# Patient Record
Sex: FEMALE | Race: WHITE | Employment: FULL TIME | URBAN - METROPOLITAN AREA
[De-identification: names, ages, dates, MRNs, and addresses within clinical notes are randomized per-mention and may not be internally consistent; named-entity substitution may affect disease eponyms.]

---

## 2016-03-21 LAB — HBA1C MFR BLD: 7.9 % (ref 4–5.6)

## 2016-07-11 LAB — TSH SERPL-ACNC: 3.3 UIU/ML (ref 0.2–4.5)

## 2017-01-04 ENCOUNTER — DOCUMENTATION ONLY (OUTPATIENT)
Dept: TRANSPLANT | Facility: CLINIC | Age: 62
End: 2017-01-04

## 2017-01-11 ENCOUNTER — TELEPHONE (OUTPATIENT)
Dept: TRANSPLANT | Facility: CLINIC | Age: 62
End: 2017-01-11

## 2017-01-11 NOTE — TELEPHONE ENCOUNTER
Hailey reports patient last seen on 07/11/16 & has an upcoming appointment on Friday, January 13, 2017.

## 2017-01-16 ENCOUNTER — MEDICAL CORRESPONDENCE (OUTPATIENT)
Dept: TRANSPLANT | Facility: CLINIC | Age: 62
End: 2017-01-16

## 2017-02-09 ENCOUNTER — TELEPHONE (OUTPATIENT)
Dept: TRANSPLANT | Facility: CLINIC | Age: 62
End: 2017-02-09

## 2017-02-09 NOTE — TELEPHONE ENCOUNTER
"Spoke with patient and noted have reviewed lab results from 01/13/17 with Dr. Zavala; with EGFR 25, noted patient can be moved to active status on the Kidney and kidney/pancreas wait lists.  From Dr. Rodriguez's 01/13/17 assessment, patient's weight was measured as 177 pounds, with a height of 4 feet, 11 inches tall.  Confirmed with patient that current height/weight are correct as patient's weight at transplant evaluation in January 2016 was just under 156 pounds.  Patient reports, \"I don't know where I gained it.  I'm not that very active. I have plantar fascitis.\" Noted with current height and weight, her body mass index (BMI) is 35.2, which is above the program bmi guideline for kidney/pancrea Asked if patient would be open to meeting again with the pre-transplant dietician.  Patient reports she would.  Also, since has been just over year since last stress test, Dr. Zavala does recommend patient have this test updated.  Told patient I will update Dr. Zavala with patient's current body mass index information and then will contact patient regarding dietician and stress test appointments.  Patient verbalizes she understands this information and is agreeable with this plan.   "

## 2017-03-07 ENCOUNTER — TELEPHONE (OUTPATIENT)
Dept: TRANSPLANT | Facility: CLINIC | Age: 62
End: 2017-03-07

## 2017-03-07 NOTE — TELEPHONE ENCOUNTER
Reviewed that Dr. Zavala does recommend patient lose weight before coming back to the clinic to meet with a transplant surgeon.  Faina reports she has been eating more due to her diabetes.  Offered to have our transplant registered dietician contact Faina to review diet guidelines for weight loss, but Faina reports she has an appointment with Dr. Westbrook (endocrine) next week and is going to ask him about weight loss.  She reports she will contact me after the appointment.  Per her request, I confirmed since she is currently inactive status on the kidney and kidney/pancreas wait lists, she doesn't need to send PRA samples every 3 months.  Noted I will await Faina's call.  She verbalizes she is in agreement with this plan.

## 2017-03-07 NOTE — TELEPHONE ENCOUNTER
Faina reports currently busy and requests I call her back later today as she is not able to write down my phone number.

## 2017-04-10 LAB
ALT SERPL-CCNC: 20 U/L (ref 9–55)
AST SERPL-CCNC: 21 U/L (ref 10–40)
CHOLEST SERPL-MCNC: 257 MG/DL (ref 0–199)
CREAT SERPL-MCNC: 1.8 MG/DL (ref 0.55–1.02)
GFR SERPL CREATININE-BSD FRML MDRD: 29 ML/MIN/1.73M2
HDLC SERPL-MCNC: 91 MG/DL
HEP C HIM: NORMAL
LDLC SERPL CALC-MCNC: 152 MG/DL (ref 19–130)
POTASSIUM SERPL-SCNC: 4.3 MMOL/L (ref 3.5–5.2)
TRIGL SERPL-MCNC: 71 MG/DL (ref 4–149)

## 2017-06-20 ENCOUNTER — TRANSFERRED RECORDS (OUTPATIENT)
Dept: HEALTH INFORMATION MANAGEMENT | Facility: CLINIC | Age: 62
End: 2017-06-20

## 2017-07-20 ENCOUNTER — TELEPHONE (OUTPATIENT)
Dept: TRANSPLANT | Facility: CLINIC | Age: 62
End: 2017-07-20

## 2017-07-20 NOTE — TELEPHONE ENCOUNTER
"Faina reports she did buy a recombinant bike and is trying to use; that she weighed herself this morning and reports her weight was 174.5lbs, so she lost 2.5 lbs.  Reports most recent creatine was 1.8 & she is seeing Dr. Chavez on 08/01/17/sees every 6 months; Faina reports she doesn't eat much, \" I had 2 cups of coffee with sugar free creamer, a lettuce salad for lunch with oil and vinegar dressing & some suzy crackers with sodium free peanut butter.  When my  and I go out to eat, we split a meal.  Dr. Westbrook has been trying to tweak my insulin pump and it's better, but I still need to eat something at bed time, so my sugars don't bottom out during the night.  Faina reports she's still not interested in meeting our speaking with our transplant dietician as \"What can they tell me?  I really don't eat a lot.\"  Faina verbalizes she remains interested in having a kidney/pancreas transplant, but also notes, \"I'm going to be 62, so I know my time is limited.\"  Encouraged Faina to use the recombinant bike and obtained her permission to request notes from Dr. Westbrook's office as well as 08/01/17 clinic visit notes for Dr. Chavez (when available).  Once I receive the information, I will review it with the transplant team.  Encouraged Faina to contact me should she have questions.   "

## 2017-08-01 ENCOUNTER — TRANSFERRED RECORDS (OUTPATIENT)
Dept: HEALTH INFORMATION MANAGEMENT | Facility: CLINIC | Age: 62
End: 2017-08-01

## 2017-08-07 ENCOUNTER — MEDICAL CORRESPONDENCE (OUTPATIENT)
Dept: TRANSPLANT | Facility: CLINIC | Age: 62
End: 2017-08-07

## 2017-09-17 ENCOUNTER — HEALTH MAINTENANCE LETTER (OUTPATIENT)
Age: 62
End: 2017-09-17

## 2017-10-31 ENCOUNTER — TRANSFERRED RECORDS (OUTPATIENT)
Dept: HEALTH INFORMATION MANAGEMENT | Facility: CLINIC | Age: 62
End: 2017-10-31

## 2017-12-05 ENCOUNTER — TELEPHONE (OUTPATIENT)
Dept: TRANSPLANT | Facility: CLINIC | Age: 62
End: 2017-12-05

## 2017-12-05 NOTE — TELEPHONE ENCOUNTER
"Patient asks when she's due to have PRA drawn.  Noted since she remains inactive status on the kidney and kidney/pancreas lists, she doesn't need to have the sample drawn unless she is being considered for active status.  Patient reports she's \"doing ok;\" that her current weight is 271 pounds; that she sees her nephrologist every 6 months and endocrinologist every 3 months.  Patient reports she has a CGM monitor, so \"my  doesn't need to bring me back if my sugar is low.  The monitor works well.\"  Asked whether patient would like to proceed to active status on the kidney/pancreas. Patient reports that she would prefer not to have any surgery at this time; \"I would like things to stay as they are, but if my kidneys start to go, I would do whatever I need to avoid dialysis.  It would also be nice not to be diabetic anymore.\"  Patient also reports she has a return appointment with Dr. Kay scheduled in January 2018 and does she need to keep this appointment.  Noted if she does not wish to pursue active status on the transplant list, she doesn't need to keep the appointment, but before cancelling it, I would like to request most current endocrinology clinic notes/results and review with our team.  Obtained patient's verbal permission to request these records and noted once I have received and reviewed her status with our team, I will update her.  Patient verbalizes she agrees with this plan.   "

## 2017-12-06 ENCOUNTER — MEDICAL CORRESPONDENCE (OUTPATIENT)
Dept: TRANSPLANT | Facility: CLINIC | Age: 62
End: 2017-12-06

## 2017-12-07 ENCOUNTER — TELEPHONE (OUTPATIENT)
Dept: TRANSPLANT | Facility: CLINIC | Age: 62
End: 2017-12-07

## 2018-04-27 ENCOUNTER — TRANSFERRED RECORDS (OUTPATIENT)
Dept: HEALTH INFORMATION MANAGEMENT | Facility: CLINIC | Age: 63
End: 2018-04-27

## 2018-05-13 ENCOUNTER — TRANSFERRED RECORDS (OUTPATIENT)
Dept: HEALTH INFORMATION MANAGEMENT | Facility: CLINIC | Age: 63
End: 2018-05-13

## 2018-05-14 ENCOUNTER — TELEPHONE (OUTPATIENT)
Dept: TRANSPLANT | Facility: CLINIC | Age: 63
End: 2018-05-14

## 2018-05-15 ENCOUNTER — TELEPHONE (OUTPATIENT)
Dept: TRANSPLANT | Facility: CLINIC | Age: 63
End: 2018-05-15

## 2018-05-15 VITALS — HEIGHT: 59 IN | WEIGHT: 176 LBS | BODY MASS INDEX: 35.48 KG/M2

## 2018-05-15 NOTE — TELEPHONE ENCOUNTER
"Patient reports seen by Dr. Chavez in February, reports creatinine 2.03, seen by diabetes specialist 04/27/18, had pelvic/pap @ [facility] on 05/07/18, and mammogram on 04/27/18.  Patient reports she remains interested in kidney/pancreas transplant, but \"not today.  I don't want to go through a major surgery until absolutely necessary.\"  She notes the CGM has been very helpful, but that her insurance will be changing from COBRA to a User[employer] plan and that she will be applying for Medicare after her 63th birthday later, so she would also prefer to \"wait until the insurance stuff calms down.\"  Obtained patient's permission to request aforementioned clinic notes and test results and will review with transplant provider once received.  Explained there is not a set \"cutoff\" age for kidney/pancreas, but when patient are approaching age 63, we review on a case by case basis.  Patient verbalizes she understands this information.   "

## 2018-05-22 ENCOUNTER — MEDICAL CORRESPONDENCE (OUTPATIENT)
Dept: TRANSPLANT | Facility: CLINIC | Age: 63
End: 2018-05-22

## 2018-05-23 ENCOUNTER — COMMITTEE REVIEW (OUTPATIENT)
Dept: TRANSPLANT | Facility: CLINIC | Age: 63
End: 2018-05-23

## 2018-06-11 NOTE — COMMITTEE REVIEW
Abdominal Patient Discussion Note Transplant Coordinator: Mya Shirley  Transplant Surgeon:   Abraham Oliveira    Referring Physician: Aravind Chavez    Committee Review Members:  Nephrology Ok Schaffer, APRN CNP, Sarkis Elias MD   Nutrition Tanika Vazquez,    Pharmacy Clarisse Mai, [facility]    - Clinical Connie Farrar, [facility], Sully Yanes, MSW   Transplant Amparo Collins PA-C, Mya Shirley, DALI, Martha Vallecillo, [facility], Benita Zheng RN, Jose Mcleod MD, Darryl Steele MD       Additional Discussion Notes and Findings:   Case not discussed due to time limit of meeting.

## 2018-06-27 ENCOUNTER — COMMITTEE REVIEW (OUTPATIENT)
Dept: TRANSPLANT | Facility: CLINIC | Age: 63
End: 2018-06-27

## 2018-06-27 NOTE — COMMITTEE REVIEW
Abdominal Patient Discussion Note Transplant Coordinator: Mya Shirley  Transplant Surgeon:   Abraham Oliveira    Referring Physician: Aravind Chavez    Committee Review Members:  Nephrology Kendrick Richardson MD, Sarkis Elias MD   Nutrition Tanika Vazquez,    Pharmacy Emi Keller, [facility]    - Clinical Connie Farrar, MSW, Sully Yanes, MSW   Transplant Amparo Collins PA-C, Mya Shirley, DALI, Taylor Guadalupe, RN, Juliane Shook, DALI, Benita Zheng, DALI, Zack Zavala MD, Jose Mcleod MD, Zeny Turner, DALI       Additional Discussion Notes and Findings:   Case reviewed by team.  Team advises patient be scheduled for weight loss management  clinic appointments in the fall after insurance has been changed.  (Patient previously reported she does not want to come back for appointments until her insurance has changed after 08/30/18.

## 2018-10-02 ENCOUNTER — TELEPHONE (OUTPATIENT)
Dept: TRANSPLANT | Facility: CLINIC | Age: 63
End: 2018-10-02

## 2018-10-02 NOTE — TELEPHONE ENCOUNTER
"Faina reports she thought she had given new insurance information to Janae Grullon when they last spoke.  Confirmed from Janae's note that Faina was to contact Janae with this information.  Reviewed once this is completed, since it has been a few years since Faina has seen a transplant provider, would like to schedule an appointment for her.  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    "

## 2018-11-25 ENCOUNTER — HOSPITAL ENCOUNTER (EMERGENCY)
Facility: CLINIC | Age: 63
Discharge: HOME | End: 2018-11-25
Attending: EMERGENCY MEDICINE | Admitting: EMERGENCY MEDICINE
Payer: MEDICARE

## 2018-11-25 VITALS
TEMPERATURE: 98.3 F | RESPIRATION RATE: 18 BRPM | DIASTOLIC BLOOD PRESSURE: 79 MMHG | BODY MASS INDEX: 35.95 KG/M2 | OXYGEN SATURATION: 95 % | SYSTOLIC BLOOD PRESSURE: 139 MMHG | HEART RATE: 86 BPM | WEIGHT: 178 LBS

## 2018-11-25 DIAGNOSIS — M54.2 NECK PAIN: ICD-10-CM

## 2018-11-25 LAB
ANION GAP SERPL CALCULATED.3IONS-SCNC: 15 MMOL/L (ref 3–14)
BASOPHILS # BLD AUTO: 0 10E9/L (ref 0–0.2)
BASOPHILS NFR BLD AUTO: 0.3 %
BUN SERPL-MCNC: 37 MG/DL (ref 7–30)
CALCIUM SERPL-MCNC: 8.4 MG/DL (ref 8.5–10.1)
CHLORIDE SERPL-SCNC: 101 MMOL/L (ref 94–109)
CO2 SERPL-SCNC: 22 MMOL/L (ref 20–32)
CREAT SERPL-MCNC: 1.79 MG/DL (ref 0.52–1.04)
DIFFERENTIAL METHOD BLD: NORMAL
EOSINOPHIL NFR BLD AUTO: 2 %
ERYTHROCYTE [DISTWIDTH] IN BLOOD BY AUTOMATED COUNT: 12.7 % (ref 10–15)
GFR SERPL CREATININE-BSD FRML MDRD: 28 ML/MIN/1.7M2
GLUCOSE SERPL-MCNC: 179 MG/DL (ref 70–99)
HCT VFR BLD AUTO: 41.1 % (ref 35–47)
HGB BLD-MCNC: 13.6 G/DL (ref 11.7–15.7)
IMM GRANULOCYTES # BLD: 0 10E9/L (ref 0–0.4)
IMM GRANULOCYTES NFR BLD: 0.3 %
LYMPHOCYTES # BLD AUTO: 0.8 10E9/L (ref 0.8–5.3)
LYMPHOCYTES NFR BLD AUTO: 7.7 %
MCH RBC QN AUTO: 31.6 PG (ref 26.5–33)
MCHC RBC AUTO-ENTMCNC: 33.1 G/DL (ref 31.5–36.5)
MCV RBC AUTO: 95 FL (ref 78–100)
MONOCYTES # BLD AUTO: 0.5 10E9/L (ref 0–1.3)
MONOCYTES NFR BLD AUTO: 5.4 %
NEUTROPHILS # BLD AUTO: 8.2 10E9/L (ref 1.6–8.3)
NEUTROPHILS NFR BLD AUTO: 84.3 %
NRBC # BLD AUTO: 0 10*3/UL
NRBC BLD AUTO-RTO: 0 /100
PLATELET # BLD AUTO: 341 10E9/L (ref 150–450)
POTASSIUM SERPL-SCNC: 3.7 MMOL/L (ref 3.4–5.3)
RBC # BLD AUTO: 4.31 10E12/L (ref 3.8–5.2)
SODIUM SERPL-SCNC: 138 MMOL/L (ref 133–144)
TROPONIN I SERPL-MCNC: <0.015 UG/L (ref 0–0.04)
WBC # BLD AUTO: 9.8 10E9/L (ref 4–11)

## 2018-11-25 PROCEDURE — 93005 ELECTROCARDIOGRAM TRACING: CPT | Performed by: EMERGENCY MEDICINE

## 2018-11-25 PROCEDURE — 85025 COMPLETE CBC W/AUTO DIFF WBC: CPT | Performed by: EMERGENCY MEDICINE

## 2018-11-25 PROCEDURE — 84484 ASSAY OF TROPONIN QUANT: CPT | Performed by: EMERGENCY MEDICINE

## 2018-11-25 PROCEDURE — 96374 THER/PROPH/DIAG INJ IV PUSH: CPT | Performed by: EMERGENCY MEDICINE

## 2018-11-25 PROCEDURE — 99285 EMERGENCY DEPT VISIT HI MDM: CPT | Mod: 25 | Performed by: EMERGENCY MEDICINE

## 2018-11-25 PROCEDURE — 93010 ELECTROCARDIOGRAM REPORT: CPT | Mod: Z6 | Performed by: EMERGENCY MEDICINE

## 2018-11-25 PROCEDURE — 25000128 H RX IP 250 OP 636: Performed by: EMERGENCY MEDICINE

## 2018-11-25 PROCEDURE — 80048 BASIC METABOLIC PNL TOTAL CA: CPT | Performed by: EMERGENCY MEDICINE

## 2018-11-25 RX ORDER — HYDROCODONE BITARTRATE AND ACETAMINOPHEN 5; 325 MG/1; MG/1
1 TABLET ORAL EVERY 4 HOURS PRN
Qty: 15 TABLET | Refills: 0 | Status: SHIPPED | OUTPATIENT
Start: 2018-11-25

## 2018-11-25 RX ORDER — HYDROMORPHONE HYDROCHLORIDE 1 MG/ML
0.2 INJECTION, SOLUTION INTRAMUSCULAR; INTRAVENOUS; SUBCUTANEOUS ONCE
Status: COMPLETED | OUTPATIENT
Start: 2018-11-25 | End: 2018-11-25

## 2018-11-25 RX ADMIN — Medication 0.2 MG: at 07:57

## 2018-11-25 NOTE — DISCHARGE INSTRUCTIONS
Your EKG and blood tests for heart as the cause of your symptoms were normal.  Please take the pain medicines with cautions.  Return to the ER if new or worsening symptoms.

## 2018-11-25 NOTE — ED NOTES
Pt denies improvement in pain after dilaudid.  Pt states she does not like the side effects, uneasy whole body sensation.  Pt was neausous but that subsided.  Pt declined further dilaudid.  Discussion of previous medications that helped with pain relief revealed success with hydrocodone.  MD José advised.

## 2018-11-25 NOTE — ED AVS SNAPSHOT
[facility] Emergency Department    [address] [place] [address]    Phone:  [phone]    Fax:  [phone]                                       Ana Seth   MRN: [id number]    Department:  [facility] Emergency Department   Date of Visit:  11/25/2018           Patient Information     Date Of Birth          1955        Your diagnoses for this visit were:     Neck pain        You were seen by Kendrick Kumar MD.      Follow-up Information     Follow up with Kendrick Phillips In 3 days.    Specialty:  Pediatrics    Why:  If symptoms worsen, ER follow up    Contact information:    [facility]  [address] [address] [phone]          Discharge Instructions       Your EKG and blood tests for heart as the cause of your symptoms were normal.  Please take the pain medicines with cautions.  Return to the ER if new or worsening symptoms.    24 Hour Appointment Hotline       To make an appointment at any [facility], call [phone] ([phone]). If you don't have a family doctor or clinic, we will help you find one. [facility] clinics are conveniently located to serve the needs of you and your family.             Review of your medicines      START taking        Dose / Directions Last dose taken    HYDROcodone-acetaminophen 5-325 MG tablet   Commonly known as:  NORCO   Dose:  1 tablet   Quantity:  15 tablet        Take 1 tablet by mouth every 4 hours as needed   Refills:  0          Our records show that you are taking the medicines listed below. If these are incorrect, please call your family doctor or clinic.        Dose / Directions Last dose taken    AMLODIPINE BESYLATE PO   Dose:  10 mg        Take 10 mg by mouth daily   Refills:  0        Aspirin 81 MG Tbdp   Dose:  81 mg   Quantity:  72 tablet        Take 81 mg by mouth daily   Refills:  0        brimonidine-timolol 0.2-0.5 % ophthalmic solution   Commonly known as:  COMBIGAN    Dose:  1 drop        Place 1 drop into the right eye 2 times daily   Refills:  0        insulin aspart 100 UNIT/ML pen   Commonly known as:  NovoLOG PEN        Inject Subcutaneous 3 times daily (with meals) Insulin pump   Refills:  0        latanoprost 0.005 % ophthalmic solution   Commonly known as:  XALATAN   Dose:  1 drop        Place 1 drop into the right eye At Bedtime   Refills:  0        VITAMIN D PO   Dose:  1 capsule        Take 1 capsule by mouth One capsule every Monday, Wednesday and Friday evening   Refills:  0                Information about OPIOIDS     PRESCRIPTION OPIOIDS: WHAT YOU NEED TO KNOW   We gave you an opioid (narcotic) pain medicine. It is important to manage your pain, but opioids are not always the best choice. You should first try all the other options your care team gave you. Take this medicine for as short a time (and as few doses) as possible.    Some activities can increase your pain, such as bandage changes or therapy sessions. It may help to take your pain medicine 30 to 60 minutes before these activities. Reduce your stress by getting enough sleep, working on hobbies you enjoy and practicing relaxation or meditation. Talk to your care team about ways to manage your pain beyond prescription opioids.    These medicines have risks:    DO NOT drive when on new or higher doses of pain medicine. These medicines can affect your alertness and reaction times, and you could be arrested for driving under the influence (DUI). If you need to use opioids long-term, talk to your care team about driving.    DO NOT operate heavy machinery    DO NOT do any other dangerous activities while taking these medicines.    DO NOT drink any alcohol while taking these medicines.     If the opioid prescribed includes acetaminophen, DO NOT take with any other medicines that contain acetaminophen. Read all labels carefully. Look for the word  acetaminophen  or  Tylenol.  Ask your pharmacist if you have  questions or are unsure.    You can get addicted to pain medicines, especially if you have a history of addiction (chemical, alcohol or substance dependence). Talk to your care team about ways to reduce this risk.    All opioids tend to cause constipation. Drink plenty of water and eat foods that have a lot of fiber, such as fruits, vegetables, prune juice, apple juice and high-fiber cereal. Take a laxative (Miralax, milk of magnesia, Colace, Senna) if you don t move your bowels at least every other day. Other side effects include upset stomach, sleepiness, dizziness, throwing up, tolerance (needing more of the medicine to have the same effect), physical dependence and slowed breathing.    Store your pills in a secure place, locked if possible. We will not replace any lost or stolen medicine. If you don t finish your medicine, please throw away (dispose) as directed by your pharmacist. The [place] [employer]ution [employer] has more information about safe disposal: https://[email or web address].[email or web address]/living-green/managing-unwanted-medications        Prescriptions were sent or printed at these locations (1 Prescription)                   Other Prescriptions                Printed at Department/Unit printer (1 of 1)         HYDROcodone-acetaminophen (NORCO) 5-325 MG tablet                Procedures and tests performed during your visit     Basic metabolic panel    CBC with platelets differential    EKG 12 lead    Peripheral IV catheter    Troponin I      Orders Needing Specimen Collection     None      Pending Results     No orders found from 11/23/2018 to 11/26/2018.            Pending Culture Results     No orders found from 11/23/2018 to 11/26/2018.            Pending Results Instructions     If you had any lab results that were not finalized at the time of your Discharge, you can call the ED Lab Result RN at [phone]. You will be contacted by this team for any positive Lab results or changes in treatment. The nurses  are available 7 days a week from 10A to 6:30P.  You can leave a message 24 hours per day and they will return your call.        Thank you for choosing [facility]       Thank you for choosing [facility] for your care. Our goal is always to provide you with excellent care. Hearing back from our patients is one way we can continue to improve our services. Please take a few minutes to complete the written survey that you may receive in the mail after you visit with us. Thank you!        [facility] Information     [facility] gives you secure access to your electronic health record. If you see a primary care provider, you can also send messages to your care team and make appointments. If you have questions, please call your primary care clinic.  If you do not have a primary care provider, please call [phone] and they will assist you.        Care EveryWhere ID     This is your Care EveryWhere ID. This could be used by other organizations to access your [facility] medical records  [id number]        Equal Access to Services     SANTHOSH SILVA : Jony Young, waalicja william, gabriele kaalmaclayton torres, rodrigo mckeon. So [facility] [phone].    ATENCIÓN: Si habla español, tiene a duval disposición servicios gratuitos de asistencia lingüística. Trish al [phone].    We comply with applicable federal civil rights laws and [place] laws. We do not discriminate on the basis of race, color, national origin, age, disability, sex, sexual orientation, or gender identity.            After Visit Summary       This is your record. Keep this with you and show to your community pharmacist(s) and doctor(s) at your next visit.

## 2018-11-25 NOTE — ED AVS SNAPSHOT
[facility] Emergency Department    [address] [place] [address]    Phone:  [phone]    Fax:  [phone]                                       Ana Seth   MRN: [id number]    Department:  [facility] Emergency Department   Date of Visit:  11/25/2018           After Visit Summary Signature Page     I have received my discharge instructions, and my questions have been answered. I have discussed any challenges I see with this plan with the nurse or doctor.    ..........................................................................................................................................  Patient/Patient Representative Signature      ..........................................................................................................................................  Patient Representative Print Name and Relationship to Patient    ..................................................               ................................................  Date                                   Time    ..........................................................................................................................................  Reviewed by Signature/Title    ...................................................              ..............................................  Date                                               Time          22EPIC Rev 08/18

## 2018-11-25 NOTE — ED PROVIDER NOTES
History     Chief Complaint   Patient presents with     Neck Pain     Flank Pain     HPI  Ana Seth is a 63 year old female who presents the emergency department complaining of left-sided neck pain and left side pain.  This started last night.  At 9:30 PM she started having left neck pain.  She did not have an injury.  She took to Tylenol.  By 2 AM she had left side pain.  He has been fairly severe, 7 out of 10.  Is been constant.  Tylenol did not help.  She has had no other symptoms.  No headache, dizziness, lightheadedness, nausea, diaphoresis, chest pain, shortness of breath, abdominal pain, leg swelling, shortness of breath.  She does have chronic kidney disease.  She is also diabetic.  She has no history of coronary artery disease.  No fever or chills.    Problem List:    Patient Active Problem List    Diagnosis Date Noted     CKD (chronic kidney disease) stage 4, GFR 15-29 ml/min (H) 2015     Priority: Medium        Past Medical History:    Past Medical History:   Diagnosis Date     Anterior ischemic optic neuropathy      Chronic renal failure      Diabetes mellitus type I (H)      Hypercholesterolemia      Hypertension        Past Surgical History:    Past Surgical History:   Procedure Laterality Date      SECTION         Family History:    Family History   Problem Relation Age of Onset     Coronary Artery Disease Father      Multiple MIs     Coronary Artery Disease Early Onset Brother 40     CABG at age 40     Cerebrovascular Disease Father      Type 1 Diabetes Brother        Social History:  Marital Status:   [2]  Social History   Substance Use Topics     Smoking status: Former Smoker     Smokeless tobacco: Not on file     Alcohol use 0.0 oz/week     0 Standard drinks or equivalent per week      Comment: Patient reports drinking a glass of wine on occasion.         Medications:      HYDROcodone-acetaminophen (NORCO) 5-325 MG tablet   AMLODIPINE BESYLATE PO   Aspirin 81 MG TBDP    brimonidine-timolol (COMBIGAN) 0.2-0.5 % ophthalmic solution   Cholecalciferol (VITAMIN D PO)   insulin aspart (NOVOLOG PEN) 100 UNIT/ML soln   latanoprost (XALATAN) 0.005 % ophthalmic solution         Review of Systems   All other systems reviewed and are negative.      Physical Exam   BP: 158/85  Pulse: 75  Heart Rate: 81  Temp: 98.3  F (36.8  C)  Resp: 18  Weight: 80.7 kg (178 lb)  SpO2: 98 %      Physical Exam   Constitutional: She is oriented to person, place, and time. She appears well-developed and well-nourished. No distress.   HENT:   Head: Normocephalic and atraumatic.   Eyes: No scleral icterus.   Neck: Normal range of motion. Neck supple. No tracheal deviation present. No thyromegaly present.   Tenderness palpation over the left trapezius but not over the cervical spine or the shoulder or the rest of the spine.   Cardiovascular: Normal rate and regular rhythm.  Exam reveals no gallop.    No murmur heard.  Pulmonary/Chest: Effort normal and breath sounds normal. She exhibits tenderness.   Tenderness over the left side of her rib cage   Abdominal: Soft. She exhibits no distension. There is no tenderness.   Neurological: She is alert and oriented to person, place, and time.   Skin: Skin is warm and dry. No rash noted. She is not diaphoretic. No erythema. No pallor.   Psychiatric: She has a normal mood and affect.       ED Course     ED Course     Procedures          Suspected muscular pain but given back and side pain a work up for cardiac etiology performed.               EKG Interpretation:      Interpreted by Kendrick Kumar  Time reviewed:825   Symptoms at time of EK   Rhythm: normal sinus   Rate: normal  Axis: left  Ectopy: none  Conduction: LAFB  ST Segments/ T Waves: No ST-T wave changes  Q Waves: none  Comparison to prior: No old EKG available    Clinical Impression: normal EKG    Results for orders placed or performed during the hospital encounter of 18 (from the past 24 hour(s))    CBC with platelets differential   Result Value Ref Range    WBC 9.8 4.0 - 11.0 10e9/L    RBC Count 4.31 3.8 - 5.2 10e12/L    Hemoglobin 13.6 11.7 - 15.7 g/dL    Hematocrit 41.1 35.0 - 47.0 %    MCV 95 78 - 100 fl    MCH 31.6 26.5 - 33.0 pg    MCHC 33.1 31.5 - 36.5 g/dL    RDW 12.7 10.0 - 15.0 %    Platelet Count 341 150 - 450 10e9/L    Diff Method Automated Method     % Neutrophils 84.3 %    % Lymphocytes 7.7 %    % Monocytes 5.4 %    % Eosinophils 2.0 %    % Basophils 0.3 %    % Immature Granulocytes 0.3 %    Nucleated RBCs 0 0 /100    Absolute Neutrophil 8.2 1.6 - 8.3 10e9/L    Absolute Lymphocytes 0.8 0.8 - 5.3 10e9/L    Absolute Monocytes 0.5 0.0 - 1.3 10e9/L    Absolute Basophils 0.0 0.0 - 0.2 10e9/L    Abs Immature Granulocytes 0.0 0 - 0.4 10e9/L    Absolute Nucleated RBC 0.0    Basic metabolic panel   Result Value Ref Range    Sodium 138 133 - 144 mmol/L    Potassium 3.7 3.4 - 5.3 mmol/L    Chloride 101 94 - 109 mmol/L    Carbon Dioxide 22 20 - 32 mmol/L    Anion Gap 15 (H) 3 - 14 mmol/L    Glucose 179 (H) 70 - 99 mg/dL    Urea Nitrogen 37 (H) 7 - 30 mg/dL    Creatinine 1.79 (H) 0.52 - 1.04 mg/dL    GFR Estimate 28 (L) >60 mL/min/1.7m2    GFR Estimate If Black 34 (L) >60 mL/min/1.7m2    Calcium 8.4 (L) 8.5 - 10.1 mg/dL   Troponin I   Result Value Ref Range    Troponin I ES <0.015 0.000 - 0.045 ug/L       Medications   HYDROmorphone (PF) (DILAUDID) injection 0.2 mg (0.2 mg Intravenous Given 11/25/18 0757)       Assessments & Plan (with Medical Decision Making)  Back and side pain with a negative cardiac workup.  The patient did not get improvement with Dilaudid but has tolerated hydrocodone in the past.  Will discharge home with 15 tablets of hydrocodone. The differential diagnosis, treatment options, risks and follow up discussed with a competent patient and/or competent family member who agrees with the plan.       I have reviewed the nursing notes.    I have reviewed the findings, diagnosis, plan and need  for follow up with the patient.      Discharge Medication List as of 11/25/2018  8:48 AM      START taking these medications    Details   HYDROcodone-acetaminophen (NORCO) 5-325 MG tablet Take 1 tablet by mouth every 4 hours as needed, Disp-15 tablet, R-0, Local Print             Final diagnoses:   Neck pain       11/25/2018   [facility] EMERGENCY DEPARTMENT     Kendrick Kumar MD  11/25/18 0593

## 2019-08-21 ENCOUNTER — MEDICAL CORRESPONDENCE (OUTPATIENT)
Dept: TRANSPLANT | Facility: CLINIC | Age: 64
End: 2019-08-21

## 2019-09-25 ENCOUNTER — COMMITTEE REVIEW (OUTPATIENT)
Dept: TRANSPLANT | Facility: CLINIC | Age: 64
End: 2019-09-25

## 2019-09-25 NOTE — COMMITTEE REVIEW
Abdominal Patient Discussion Note Transplant Coordinator: Mya Shirley  Transplant Surgeon:   Abraham Oliveira    Referring Physician: Aravind Chavez    Committee Review Members:  Nephrology Kendrick Richardson MD, KYLE Martinez CNP   Nutrition Tanika Vazquez, RD   Pharmacy Mejia Bose, [facility]    - Clinical Connie Farrar, [facility], Sully Yanes, MSW   Transplant Mya Shirley, DALI, Taylor Guadalupe RN, Juliane Shook, DALI, Jose Mcleod MD, Zeny Turner RN   Transplant Surgery Dominga Leyva MD, MD       Additional Discussion Notes and Findings:   Case reviewed by team.  Patient listed for kidney and kidney transplant 01/18/2016, remains Inactive status on K list as EGFR has remained above 20, 24 on 03/18/2019; Inactive status on kidney/pancreas list as body mass index has continued to increase, 35.76 on 08/20/19.  Team reviewed current insulin dosages, age and that patient would need to lose a significant amount of wait to meet body mass index criteria for kidney/pancreas transplant.  Team recommends patient be removed from the kidney/pancreas list as likely with a pancreas transplant, with current insulin dosages, patient will likely not become insulin independent.  Patient to remain inactive status on the kidney list until further decline in EGFR and patient will need to meet body mass index of 35 or less for kidney transplant.

## 2019-09-26 ENCOUNTER — TELEPHONE (OUTPATIENT)
Dept: TRANSPLANT | Facility: CLINIC | Age: 64
End: 2019-09-26

## 2019-09-26 NOTE — LETTER
September 26, 2019    Ana Seth  [id number] [address] [place] [address]    Dear Ms. Seth,    The purpose of this letter is to let you know the [facility] Multi-Disciplinary Selection Team made the decision to remove you from the kidney and pancreas transplant list.  The team reviewed your case and has concerns that with your current dosages of insulin that you may not achieve insulin independence with a pancreas transplant.   Your status remains Inactive on the kidney list, which means you will continue to accumulate waiting time.  If your native kidney function declines in the future, then you may wish to consider having a kidney transplant.  At that time, you will need to undergo an updated evaluation by transplant providers.  We recommend that you continue to follow up with your primary care and referring physicians in order to manage your health concerns.  Enclosed is a letter from [employer] which describes the services offered to patients by [employer] and the [facility].  Thank you for allowing us to participate in your care.   If you have any questions, please call me directly at [phone] or the Transplant Office at [phone].  Sincerely,  Mya Shirley RN, Transplant Coordinator   On Behalf of the Kidney and Pancreas Transplant Team    Enclosure:  [employer] Letter   CC: Kendrick Phillips (PCP), Aravind Chavez, Emigdio Westbrook MD

## 2019-09-26 NOTE — TELEPHONE ENCOUNTER
Explained the transplant multi-disciplinary team reviewed patient's case and recommends the patient remain listed on the kidney list, inactive status until such time that kidney function further declines.  Noted once estimated glomerular filtration rate is 20 or less, patient will need to consider moving forward with kidney transplant.  Reviewed the team recommends patient be removed from the kidney/pancreas list as the team reviewed endocrinology records, patient's current body mass index of 35.76, has struggled with weight loss and advancing age.  The team felt the patient will be at increased risk with moving forward with kidney/pancreas transplant and that she may not become insulin independent having a pancreas transplant.  Reviewed I'm removing patient's name from the kidney/pancreas list today; that patient will remain listed on the kidney list on hold (inactive status) as most current eGFR is 26, so she is 'too well' for kidney transplant; that she will continue accumulating time on the kidney waitlist.  Noted I will send the patient a waitlist removal letter for kidney/pancreas, but will continue to work with patient regarding kidney transplant.  Patient verbalizes she understands all information and agrees with this plan.

## 2020-02-23 ENCOUNTER — HEALTH MAINTENANCE LETTER (OUTPATIENT)
Age: 65
End: 2020-02-23

## 2020-12-06 ENCOUNTER — HEALTH MAINTENANCE LETTER (OUTPATIENT)
Age: 65
End: 2020-12-06

## 2021-04-11 ENCOUNTER — HEALTH MAINTENANCE LETTER (OUTPATIENT)
Age: 66
End: 2021-04-11

## 2021-05-25 ENCOUNTER — RECORDS - HEALTHEAST (OUTPATIENT)
Dept: ADMINISTRATIVE | Facility: CLINIC | Age: 66
End: 2021-05-25

## 2021-05-26 ENCOUNTER — RECORDS - HEALTHEAST (OUTPATIENT)
Dept: ADMINISTRATIVE | Facility: CLINIC | Age: 66
End: 2021-05-26

## 2021-05-28 ENCOUNTER — RECORDS - HEALTHEAST (OUTPATIENT)
Dept: ADMINISTRATIVE | Facility: CLINIC | Age: 66
End: 2021-05-28

## 2021-05-29 ENCOUNTER — RECORDS - HEALTHEAST (OUTPATIENT)
Dept: ADMINISTRATIVE | Facility: CLINIC | Age: 66
End: 2021-05-29

## 2021-09-26 ENCOUNTER — HEALTH MAINTENANCE LETTER (OUTPATIENT)
Age: 66
End: 2021-09-26

## 2021-11-20 ENCOUNTER — HEALTH MAINTENANCE LETTER (OUTPATIENT)
Age: 66
End: 2021-11-20

## 2022-05-07 ENCOUNTER — HEALTH MAINTENANCE LETTER (OUTPATIENT)
Age: 67
End: 2022-05-07

## 2023-04-23 ENCOUNTER — HEALTH MAINTENANCE LETTER (OUTPATIENT)
Age: 68
End: 2023-04-23

## 2023-05-31 ENCOUNTER — TELEPHONE (OUTPATIENT)
Dept: TRANSPLANT | Facility: CLINIC | Age: 68
End: 2023-05-31
Payer: MEDICARE

## 2023-05-31 NOTE — TELEPHONE ENCOUNTER
Called pt to introduce myself as [facility]. Left VM with direct line for return call.       Pt returned call- pt still interested in transplant and has a neph appt coming up. Pt hopes to stay where she is and not need transplant but would like to stay on the list. Will follow up with labs/neph appts periodically. Pt to contact [facility] if any changes occur in her status. Pt verbalized understanding of information and has no further questions. Encouraged to reach out if questions arise.

## 2023-06-02 ENCOUNTER — HEALTH MAINTENANCE LETTER (OUTPATIENT)
Age: 68
End: 2023-06-02

## 2024-04-12 ENCOUNTER — TELEPHONE (OUTPATIENT)
Dept: TRANSPLANT | Facility: CLINIC | Age: 69
End: 2024-04-12
Payer: MEDICARE

## 2025-06-25 ENCOUNTER — TELEPHONE (OUTPATIENT)
Dept: TRANSPLANT | Facility: CLINIC | Age: 70
End: 2025-06-25
Payer: MEDICARE

## 2025-07-15 ENCOUNTER — TELEPHONE (OUTPATIENT)
Dept: TRANSPLANT | Facility: CLINIC | Age: 70
End: 2025-07-15
Payer: MEDICARE

## 2025-07-16 ENCOUNTER — COMMITTEE REVIEW (OUTPATIENT)
Dept: TRANSPLANT | Facility: CLINIC | Age: 70
End: 2025-07-16
Payer: MEDICARE

## 2025-07-16 ENCOUNTER — DOCUMENTATION ONLY (OUTPATIENT)
Dept: TRANSPLANT | Facility: CLINIC | Age: 70
End: 2025-07-16
Payer: MEDICARE

## 2025-07-16 ENCOUNTER — TELEPHONE (OUTPATIENT)
Dept: TRANSPLANT | Facility: CLINIC | Age: 70
End: 2025-07-16
Payer: MEDICARE

## 2025-07-16 NOTE — COMMITTEE REVIEW
Kidney/Pancreas Committee Review Note     Evaluation Date: 12/28/2015  Committee Review Date: 7/16/2025    Organ being evaluated for: Kidney    Transplant Phase: Waitlist  Transplant Status: Inactive    Transplant Coordinator: Martha Rascon  Transplant Surgeon:        Referring Physician:      Primary Diagnosis: Diabetes Mellitus - Type I  Secondary Diagnosis:     Committee Review Members:  Nephrology Miryam Dorsey, NP, Kae Dao MD, Ok Schaffer, APRN CNP, Marino Valdez MD   Nurse Senia Brock, APRN CNP   Nutrition Tanika Vazquez,    Pharmacist Mya Peace, [facility]    - Clinical Paulina Patel, MS, Cherri Joyner, [facility]   Transplant LAYA FOREMAN, RN, Roberta Billy, RN, Mya Shirley, RN, Tara Hickey, RN, Deisy Moss, RN, Martha Rascon, RN, Harika Paul, RN, Taylor Guadalupe, RN, Zeny Turner, RN, Laila Mai, RN   Transplant Surgery Dominga Leyva MD, MD   Other Kendrick Richardson MD, Juwan Elam RN       Transplant Eligibility: Irreversible chronic kidney disease treated w/dialysis or expected need for dialysis    Committee Review Decision: Remove    Committee Chair Dominga Leyva MD verbally attested to the committee's decision.    Committee Discussion Details: Reviewed that pt's GFR is currently 30 and pt is too well for kidney transplant. Pt has 9 years and 5 months of waiting time. If pt were to require kidney transplant in the future she will be able to petition for her waiting time back. Primary neph notified. Committee determined pt to be removed from the kidney transplant list due to being too well for transplant.

## 2025-07-16 NOTE — TELEPHONE ENCOUNTER
Called pt regarding committee this afternoon, committee determined pt to be removed from transplant list for temp too well. Pt's GFR is 30 and pt has 9y 5m waiting time. If pt were to require a kidney in the future, will be able to petition her waiting time back in the future. Pt removed from list 7/16/25 and removal letter to be sent to pt. Pt verbalized understanding of information and has no further questions. Encouraged to reach out if questions arise.